# Patient Record
Sex: MALE | Race: WHITE | Employment: FULL TIME
[De-identification: names, ages, dates, MRNs, and addresses within clinical notes are randomized per-mention and may not be internally consistent; named-entity substitution may affect disease eponyms.]

---

## 2024-01-14 ENCOUNTER — HOSPITAL ENCOUNTER (EMERGENCY)
Facility: HOSPITAL | Age: 59
Discharge: HOME | End: 2024-01-14
Attending: EMERGENCY MEDICINE
Payer: COMMERCIAL

## 2024-01-14 ENCOUNTER — APPOINTMENT (OUTPATIENT)
Facility: HOSPITAL | Age: 59
End: 2024-01-14
Payer: COMMERCIAL

## 2024-01-14 VITALS
DIASTOLIC BLOOD PRESSURE: 89 MMHG | BODY MASS INDEX: 32.58 KG/M2 | RESPIRATION RATE: 16 BRPM | TEMPERATURE: 98.2 F | OXYGEN SATURATION: 100 % | HEIGHT: 69 IN | HEART RATE: 84 BPM | SYSTOLIC BLOOD PRESSURE: 158 MMHG | WEIGHT: 220 LBS

## 2024-01-14 DIAGNOSIS — I49.3 PVC'S (PREMATURE VENTRICULAR CONTRACTIONS): ICD-10-CM

## 2024-01-14 DIAGNOSIS — R42 LIGHTHEADEDNESS: ICD-10-CM

## 2024-01-14 DIAGNOSIS — R00.2 PALPITATIONS: Primary | ICD-10-CM

## 2024-01-14 LAB
ALBUMIN SERPL-MCNC: 3.6 G/DL (ref 3.5–5)
ALBUMIN/GLOB SERPL: 1 (ref 1.1–2.2)
ALP SERPL-CCNC: 68 U/L (ref 45–117)
ALT SERPL-CCNC: 42 U/L (ref 12–78)
ANION GAP SERPL CALC-SCNC: 6 MMOL/L (ref 5–15)
AST SERPL-CCNC: 27 U/L (ref 15–37)
BASOPHILS # BLD: 0 K/UL (ref 0–0.1)
BASOPHILS NFR BLD: 0 % (ref 0–1)
BILIRUB SERPL-MCNC: 0.8 MG/DL (ref 0.2–1)
BUN SERPL-MCNC: 19 MG/DL (ref 6–20)
BUN/CREAT SERPL: 15 (ref 12–20)
CALCIUM SERPL-MCNC: 8.7 MG/DL (ref 8.5–10.1)
CHLORIDE SERPL-SCNC: 105 MMOL/L (ref 97–108)
CO2 SERPL-SCNC: 24 MMOL/L (ref 21–32)
CREAT SERPL-MCNC: 1.27 MG/DL (ref 0.7–1.3)
DIFFERENTIAL METHOD BLD: ABNORMAL
EOSINOPHIL # BLD: 0.4 K/UL (ref 0–0.4)
EOSINOPHIL NFR BLD: 4 % (ref 0–7)
ERYTHROCYTE [DISTWIDTH] IN BLOOD BY AUTOMATED COUNT: 12.8 % (ref 11.5–14.5)
GLOBULIN SER CALC-MCNC: 3.7 G/DL (ref 2–4)
GLUCOSE SERPL-MCNC: 121 MG/DL (ref 65–100)
HCT VFR BLD AUTO: 49.9 % (ref 36.6–50.3)
HGB BLD-MCNC: 17.2 G/DL (ref 12.1–17)
IMM GRANULOCYTES # BLD AUTO: 0.1 K/UL (ref 0–0.04)
IMM GRANULOCYTES NFR BLD AUTO: 1 % (ref 0–0.5)
LYMPHOCYTES # BLD: 3 K/UL (ref 0.8–3.5)
LYMPHOCYTES NFR BLD: 27 % (ref 12–49)
MAGNESIUM SERPL-MCNC: 1.9 MG/DL (ref 1.6–2.4)
MCH RBC QN AUTO: 30.1 PG (ref 26–34)
MCHC RBC AUTO-ENTMCNC: 34.5 G/DL (ref 30–36.5)
MCV RBC AUTO: 87.2 FL (ref 80–99)
MONOCYTES # BLD: 0.6 K/UL (ref 0–1)
MONOCYTES NFR BLD: 6 % (ref 5–13)
NEUTS SEG # BLD: 6.9 K/UL (ref 1.8–8)
NEUTS SEG NFR BLD: 62 % (ref 32–75)
NRBC # BLD: 0 K/UL (ref 0–0.01)
NRBC BLD-RTO: 0 PER 100 WBC
NT PRO BNP: 88 PG/ML
PLATELET # BLD AUTO: 290 K/UL (ref 150–400)
PMV BLD AUTO: 8.6 FL (ref 8.9–12.9)
POTASSIUM SERPL-SCNC: 3.6 MMOL/L (ref 3.5–5.1)
PROT SERPL-MCNC: 7.3 G/DL (ref 6.4–8.2)
RBC # BLD AUTO: 5.72 M/UL (ref 4.1–5.7)
SODIUM SERPL-SCNC: 135 MMOL/L (ref 136–145)
TROPONIN I SERPL HS-MCNC: 33 NG/L (ref 0–76)
WBC # BLD AUTO: 11 K/UL (ref 4.1–11.1)

## 2024-01-14 PROCEDURE — 99285 EMERGENCY DEPT VISIT HI MDM: CPT

## 2024-01-14 PROCEDURE — 93005 ELECTROCARDIOGRAM TRACING: CPT | Performed by: EMERGENCY MEDICINE

## 2024-01-14 PROCEDURE — 84484 ASSAY OF TROPONIN QUANT: CPT

## 2024-01-14 PROCEDURE — 83735 ASSAY OF MAGNESIUM: CPT

## 2024-01-14 PROCEDURE — 71045 X-RAY EXAM CHEST 1 VIEW: CPT

## 2024-01-14 PROCEDURE — 85025 COMPLETE CBC W/AUTO DIFF WBC: CPT

## 2024-01-14 PROCEDURE — 80053 COMPREHEN METABOLIC PANEL: CPT

## 2024-01-14 PROCEDURE — 36415 COLL VENOUS BLD VENIPUNCTURE: CPT

## 2024-01-14 PROCEDURE — 83880 ASSAY OF NATRIURETIC PEPTIDE: CPT

## 2024-01-14 ASSESSMENT — PAIN - FUNCTIONAL ASSESSMENT: PAIN_FUNCTIONAL_ASSESSMENT: NONE - DENIES PAIN

## 2024-01-14 NOTE — ED TRIAGE NOTES
Pt was in  [place] at [facility] and called 911 because he felt faint and his watch told him his HR was 145. [place] dispatch called [facility] ED and 2 Rns went to assess pt and bring pt into ER for evaluation. Pt reports \"I felt like I was in Afib\". Pt denies hx of afib. Pt reports having an stress test done Monday for mitral valve issue and is waiting for results. EKG performed and given to provider

## 2024-01-14 NOTE — ED PROVIDER NOTES
moist.   Eyes:      Extraocular Movements: Extraocular movements intact.      Conjunctiva/sclera: Conjunctivae normal.      Pupils: Pupils are equal, round, and reactive to light.   Cardiovascular:      Rate and Rhythm: Normal rate and regular rhythm.      Heart sounds: Normal heart sounds.   Pulmonary:      Effort: Pulmonary effort is normal.      Breath sounds: Normal breath sounds.   Abdominal:      General: There is no distension.      Palpations: Abdomen is soft.      Tenderness: There is no abdominal tenderness.   Musculoskeletal:         General: No tenderness.      Cervical back: Neck supple.   Skin:     General: Skin is warm and dry.   Neurological:      General: No focal deficit present.      Mental Status: He is alert and oriented to person, place, and time.             EMERGENCY DEPARTMENT COURSE and DIFFERENTIAL DIAGNOSIS/MDM:   Vitals:    Vitals:    01/14/24 1323   BP: (!) 158/89   Pulse: 84   Resp: 16   Temp: 98.2 °F (36.8 °C)   TempSrc: Oral   SpO2: 100%   Weight: 99.8 kg (220 lb)   Height: 1.753 m (5' 9\")         Medical Decision Making  Amount and/or Complexity of Data Reviewed  Labs: ordered.  Radiology: ordered.  ECG/medicine tests: ordered.      58-year-old male with mitral regurgitation scheduled for cardiac surgery presents with palpitations and lightheadedness.  Noted to have PVCs on monitor.  Is afebrile with vital signs stable in no acute distress otherwise.  Labs returned very reassuringly showing no significant abnormalities.  Normal electrolytes, negative troponin.    CXR viewed by myself and read by radiology showing no acute abnormalities.  He was monitored in the ED and found to have occasional PVCs but no further runs of significant tachycardia and no further symptoms while in the ED.  He was ambulatory around the emergency department and to the bathroom without difficulty and remained well-appearing.  He was recommended alcohol use cessation and decreased caffeine intake and close

## 2024-01-14 NOTE — DISCHARGE INSTRUCTIONS
Please decrease caffeine and alcohol consumption and follow-up closely with your cardiologist and cardiac surgeon.  Please return to the emergency department if you are feeling passing out or experience chest pain.

## 2024-01-15 LAB
EKG ATRIAL RATE: 83 BPM
EKG DIAGNOSIS: NORMAL
EKG P AXIS: 80 DEGREES
EKG P-R INTERVAL: 174 MS
EKG Q-T INTERVAL: 372 MS
EKG QRS DURATION: 112 MS
EKG QTC CALCULATION (BAZETT): 437 MS
EKG R AXIS: 52 DEGREES
EKG T AXIS: -88 DEGREES
EKG VENTRICULAR RATE: 83 BPM

## 2024-01-15 PROCEDURE — 93010 ELECTROCARDIOGRAM REPORT: CPT | Performed by: SPECIALIST

## 2024-04-13 ENCOUNTER — HOSPITAL ENCOUNTER (EMERGENCY)
Facility: HOSPITAL | Age: 59
Discharge: HOME | End: 2024-04-13
Attending: STUDENT IN AN ORGANIZED HEALTH CARE EDUCATION/TRAINING PROGRAM
Payer: COMMERCIAL

## 2024-04-13 ENCOUNTER — APPOINTMENT (OUTPATIENT)
Facility: HOSPITAL | Age: 59
End: 2024-04-13
Payer: COMMERCIAL

## 2024-04-13 VITALS
HEIGHT: 69 IN | WEIGHT: 220 LBS | SYSTOLIC BLOOD PRESSURE: 123 MMHG | RESPIRATION RATE: 19 BRPM | BODY MASS INDEX: 32.58 KG/M2 | OXYGEN SATURATION: 93 % | HEART RATE: 99 BPM | DIASTOLIC BLOOD PRESSURE: 77 MMHG | TEMPERATURE: 97.7 F

## 2024-04-13 DIAGNOSIS — J18.9 PNEUMONIA DUE TO INFECTIOUS ORGANISM, UNSPECIFIED LATERALITY, UNSPECIFIED PART OF LUNG: ICD-10-CM

## 2024-04-13 DIAGNOSIS — G89.18 POST-OP PAIN: Primary | ICD-10-CM

## 2024-04-13 LAB
ALBUMIN SERPL-MCNC: 3.3 G/DL (ref 3.5–5)
ALBUMIN/GLOB SERPL: 1 (ref 1.1–2.2)
ALP SERPL-CCNC: 94 U/L (ref 45–117)
ALT SERPL-CCNC: 46 U/L (ref 12–78)
ANION GAP SERPL CALC-SCNC: 6 MMOL/L (ref 5–15)
AST SERPL-CCNC: 18 U/L (ref 15–37)
BASOPHILS # BLD: 0 K/UL (ref 0–0.1)
BASOPHILS NFR BLD: 0 % (ref 0–1)
BILIRUB SERPL-MCNC: 0.4 MG/DL (ref 0.2–1)
BUN SERPL-MCNC: 15 MG/DL (ref 6–20)
BUN/CREAT SERPL: 14 (ref 12–20)
CALCIUM SERPL-MCNC: 8.6 MG/DL (ref 8.5–10.1)
CHLORIDE SERPL-SCNC: 106 MMOL/L (ref 97–108)
CO2 SERPL-SCNC: 24 MMOL/L (ref 21–32)
CREAT SERPL-MCNC: 1.11 MG/DL (ref 0.7–1.3)
DIFFERENTIAL METHOD BLD: ABNORMAL
EOSINOPHIL # BLD: 0.6 K/UL (ref 0–0.4)
EOSINOPHIL NFR BLD: 5 % (ref 0–7)
ERYTHROCYTE [DISTWIDTH] IN BLOOD BY AUTOMATED COUNT: 13.1 % (ref 11.5–14.5)
GLOBULIN SER CALC-MCNC: 3.3 G/DL (ref 2–4)
GLUCOSE SERPL-MCNC: 165 MG/DL (ref 65–100)
HCT VFR BLD AUTO: 42.5 % (ref 36.6–50.3)
HGB BLD-MCNC: 14.6 G/DL (ref 12.1–17)
IMM GRANULOCYTES # BLD AUTO: 0 K/UL (ref 0–0.04)
IMM GRANULOCYTES NFR BLD AUTO: 0 % (ref 0–0.5)
LYMPHOCYTES # BLD: 1.6 K/UL (ref 0.8–3.5)
LYMPHOCYTES NFR BLD: 15 % (ref 12–49)
MCH RBC QN AUTO: 29.9 PG (ref 26–34)
MCHC RBC AUTO-ENTMCNC: 34.4 G/DL (ref 30–36.5)
MCV RBC AUTO: 86.9 FL (ref 80–99)
MONOCYTES # BLD: 0.6 K/UL (ref 0–1)
MONOCYTES NFR BLD: 5 % (ref 5–13)
NEUTS SEG # BLD: 8.3 K/UL (ref 1.8–8)
NEUTS SEG NFR BLD: 75 % (ref 32–75)
NRBC # BLD: 0 K/UL (ref 0–0.01)
NRBC BLD-RTO: 0 PER 100 WBC
PLATELET # BLD AUTO: 393 K/UL (ref 150–400)
PMV BLD AUTO: 7.8 FL (ref 8.9–12.9)
POTASSIUM SERPL-SCNC: 3.7 MMOL/L (ref 3.5–5.1)
PROT SERPL-MCNC: 6.6 G/DL (ref 6.4–8.2)
RBC # BLD AUTO: 4.89 M/UL (ref 4.1–5.7)
SODIUM SERPL-SCNC: 136 MMOL/L (ref 136–145)
TROPONIN I SERPL HS-MCNC: 40 NG/L (ref 0–76)
TROPONIN I SERPL HS-MCNC: 41 NG/L (ref 0–76)
WBC # BLD AUTO: 11.1 K/UL (ref 4.1–11.1)

## 2024-04-13 PROCEDURE — 99285 EMERGENCY DEPT VISIT HI MDM: CPT

## 2024-04-13 PROCEDURE — 84484 ASSAY OF TROPONIN QUANT: CPT

## 2024-04-13 PROCEDURE — 96376 TX/PRO/DX INJ SAME DRUG ADON: CPT

## 2024-04-13 PROCEDURE — 80053 COMPREHEN METABOLIC PANEL: CPT

## 2024-04-13 PROCEDURE — 85025 COMPLETE CBC W/AUTO DIFF WBC: CPT

## 2024-04-13 PROCEDURE — 6370000000 HC RX 637 (ALT 250 FOR IP): Performed by: STUDENT IN AN ORGANIZED HEALTH CARE EDUCATION/TRAINING PROGRAM

## 2024-04-13 PROCEDURE — 71275 CT ANGIOGRAPHY CHEST: CPT

## 2024-04-13 PROCEDURE — 36415 COLL VENOUS BLD VENIPUNCTURE: CPT

## 2024-04-13 PROCEDURE — 6360000002 HC RX W HCPCS: Performed by: STUDENT IN AN ORGANIZED HEALTH CARE EDUCATION/TRAINING PROGRAM

## 2024-04-13 PROCEDURE — 6360000004 HC RX CONTRAST MEDICATION: Performed by: STUDENT IN AN ORGANIZED HEALTH CARE EDUCATION/TRAINING PROGRAM

## 2024-04-13 PROCEDURE — 93005 ELECTROCARDIOGRAM TRACING: CPT | Performed by: STUDENT IN AN ORGANIZED HEALTH CARE EDUCATION/TRAINING PROGRAM

## 2024-04-13 PROCEDURE — 96374 THER/PROPH/DIAG INJ IV PUSH: CPT

## 2024-04-13 RX ORDER — DOXYCYCLINE HYCLATE 100 MG
100 TABLET ORAL ONCE
Status: COMPLETED | OUTPATIENT
Start: 2024-04-13 | End: 2024-04-13

## 2024-04-13 RX ORDER — LIDOCAINE 4 G/G
1 PATCH TOPICAL DAILY
Status: DISCONTINUED | OUTPATIENT
Start: 2024-04-13 | End: 2024-04-13 | Stop reason: HOSPADM

## 2024-04-13 RX ORDER — LIDOCAINE 4 G/G
1 PATCH TOPICAL DAILY
Qty: 30 PATCH | Refills: 0 | Status: SHIPPED | OUTPATIENT
Start: 2024-04-13 | End: 2024-05-13

## 2024-04-13 RX ORDER — DOXYCYCLINE HYCLATE 100 MG
100 TABLET ORAL 2 TIMES DAILY
Qty: 20 TABLET | Refills: 0 | Status: SHIPPED | OUTPATIENT
Start: 2024-04-13 | End: 2024-04-23

## 2024-04-13 RX ORDER — MORPHINE SULFATE 2 MG/ML
2 INJECTION, SOLUTION INTRAMUSCULAR; INTRAVENOUS
Status: COMPLETED | OUTPATIENT
Start: 2024-04-13 | End: 2024-04-13

## 2024-04-13 RX ORDER — ACETAMINOPHEN 500 MG
500 TABLET ORAL 4 TIMES DAILY PRN
Qty: 360 TABLET | Refills: 1 | Status: SHIPPED | OUTPATIENT
Start: 2024-04-13

## 2024-04-13 RX ADMIN — MORPHINE SULFATE 2 MG: 2 INJECTION, SOLUTION INTRAMUSCULAR; INTRAVENOUS at 19:34

## 2024-04-13 RX ADMIN — DOXYCYCLINE HYCLATE 100 MG: 100 TABLET, COATED ORAL at 19:34

## 2024-04-13 RX ADMIN — MORPHINE SULFATE 2 MG: 2 INJECTION, SOLUTION INTRAMUSCULAR; INTRAVENOUS at 17:50

## 2024-04-13 RX ADMIN — IOPAMIDOL 100 ML: 755 INJECTION, SOLUTION INTRAVENOUS at 16:59

## 2024-04-13 ASSESSMENT — PAIN SCALES - GENERAL
PAINLEVEL_OUTOF10: 7
PAINLEVEL_OUTOF10: 6
PAINLEVEL_OUTOF10: 5
PAINLEVEL_OUTOF10: 7
PAINLEVEL_OUTOF10: 7

## 2024-04-13 ASSESSMENT — PAIN DESCRIPTION - LOCATION
LOCATION: BACK;SHOULDER
LOCATION: CHEST
LOCATION: SHOULDER
LOCATION: SHOULDER

## 2024-04-13 ASSESSMENT — LIFESTYLE VARIABLES
HOW OFTEN DO YOU HAVE A DRINK CONTAINING ALCOHOL: NEVER
HOW MANY STANDARD DRINKS CONTAINING ALCOHOL DO YOU HAVE ON A TYPICAL DAY: PATIENT DOES NOT DRINK

## 2024-04-13 ASSESSMENT — PAIN - FUNCTIONAL ASSESSMENT: PAIN_FUNCTIONAL_ASSESSMENT: 0-10

## 2024-04-13 ASSESSMENT — PAIN DESCRIPTION - ORIENTATION: ORIENTATION: LEFT;RIGHT

## 2024-04-13 ASSESSMENT — PAIN DESCRIPTION - DESCRIPTORS: DESCRIPTORS: ACHING;SHARP

## 2024-04-13 NOTE — ED TRIAGE NOTES
Patient is a 58 year old male complaining of chest pain that started this morning. Patient recently had a mitral valve replacement on the 4/4/2024. Patient reports the pain changes with breathing. Patient reports the pain came on while sitting at home during his taxes. Patient alert and oriented x4, in NAD. Denies shortness of breath.

## 2024-04-13 NOTE — ED NOTES
Pt discharged to home in [place], states understanding of dc instructions and followup, rest and hydrate, pain control, rx x 3 given pt ambulatory with spouse tolerating well

## 2024-04-13 NOTE — ED NOTES
Verbal shift change report given to ROXY Bledsoe (oncoming nurse) by ROXY Fontaine (offgoing nurse). Report included the following information Nurse Handoff Report, Index, ED SBAR, MAR, and Recent Results.

## 2024-04-14 LAB
EKG ATRIAL RATE: 88 BPM
EKG DIAGNOSIS: NORMAL
EKG P AXIS: 53 DEGREES
EKG P-R INTERVAL: 208 MS
EKG Q-T INTERVAL: 420 MS
EKG QRS DURATION: 140 MS
EKG QTC CALCULATION (BAZETT): 508 MS
EKG R AXIS: 30 DEGREES
EKG T AXIS: 106 DEGREES
EKG VENTRICULAR RATE: 88 BPM

## 2024-04-14 PROCEDURE — 93010 ELECTROCARDIOGRAM REPORT: CPT | Performed by: SPECIALIST

## 2024-04-14 NOTE — ED PROVIDER NOTES
Course User Index  [AS] Scott Guerra MD       CONSULTS:  None    PROCEDURES:  Procedures    FINAL IMPRESSION      1. Post-op pain    2. Pneumonia due to infectious organism, unspecified laterality, unspecified part of lung          DISPOSITION/PLAN   DISPOSITION Decision To Discharge 04/13/2024 07:51:38 PM    (Please note that portions of this note were completed with a voice recognition program.  Efforts were made to edit the dictations but occasionally words are mis-transcribed.)    Scott Guerra MD (electronically signed)  Emergency Attending Physician        Scott Guerra MD  04/14/24 1483

## 2025-01-02 ENCOUNTER — OFFICE VISIT (OUTPATIENT)
Age: 60
End: 2025-01-02

## 2025-01-02 VITALS
RESPIRATION RATE: 18 BRPM | TEMPERATURE: 98.9 F | WEIGHT: 225 LBS | SYSTOLIC BLOOD PRESSURE: 120 MMHG | HEIGHT: 70 IN | HEART RATE: 60 BPM | BODY MASS INDEX: 32.21 KG/M2 | DIASTOLIC BLOOD PRESSURE: 77 MMHG | OXYGEN SATURATION: 97 %

## 2025-01-02 DIAGNOSIS — R05.1 ACUTE COUGH: ICD-10-CM

## 2025-01-02 DIAGNOSIS — J06.9 VIRAL UPPER RESPIRATORY TRACT INFECTION: Primary | ICD-10-CM

## 2025-01-02 LAB
INFLUENZA A ANTIGEN, POC: NEGATIVE
INFLUENZA B ANTIGEN, POC: NEGATIVE
Lab: NORMAL
PERFORMING INSTRUMENT: NORMAL
QC PASS/FAIL: NORMAL
SARS-COV-2, POC: NORMAL

## 2025-01-02 RX ORDER — CARVEDILOL 6.25 MG/1
6.25 TABLET ORAL 2 TIMES DAILY WITH MEALS
COMMUNITY

## 2025-01-02 RX ORDER — SACUBITRIL AND VALSARTAN 24; 26 MG/1; MG/1
1 TABLET, FILM COATED ORAL 2 TIMES DAILY
COMMUNITY

## 2025-01-02 ASSESSMENT — ENCOUNTER SYMPTOMS
SINUS PRESSURE: 1
SORE THROAT: 1
SHORTNESS OF BREATH: 0
WHEEZING: 0
COUGH: 1
CHEST TIGHTNESS: 0

## 2025-01-02 NOTE — PROGRESS NOTES
2025   Deonte Evans (: 1965) is a 59 y.o. male, New patient, here for evaluation of the following chief complaint(s):  Cough, Congestion (Cough and congestion - several days - lots of post nasal drip), and Chest Congestion     ASSESSMENT/PLAN:  Below is the assessment and plan developed based on review of pertinent history, physical exam, labs, studies, and medications.  1. Viral upper respiratory tract infection  2. Acute cough  -     POCT COVID-19, Antigen  -     POCT Influenza A/B Antigen         Handout given with care instructions  2. OTC for symptom management. Increase fluid intake, ensure adequate nutritional intake.  3. Follow up with PCP as needed.  4. Go to ED with development of any acute symptoms.     Follow up:  Return if symptoms worsen or fail to improve.  Follow up immediately for any new, worsening or changes or if symptoms are not improving over the next 5-7 days.     SUBJECTIVE/OBJECTIVE:    Cough  Associated symptoms include a sore throat. Pertinent negatives include no chills, fever, shortness of breath or wheezing.   Chest Congestion  Associated symptoms: congestion, cough, fatigue and sore throat    Associated symptoms: no fever, no shortness of breath and no wheezing             Cough, Congestion (Cough and congestion - several days - lots of post nasal drip), and Chest Congestion      Results for orders placed or performed in visit on 25   POCT COVID-19, Antigen   Result Value Ref Range    SARS-COV-2, POC Not-Detected Not Detected    Lot Number [id number]     QC Pass/Fail pass     Performing Instrument BinaxNOW    POCT Influenza A/B Antigen   Result Value Ref Range    Inflenza A Ag negative     Influenza B Ag negative            Review of Systems   Constitutional:  Positive for fatigue. Negative for chills and fever.   HENT:  Positive for congestion, sinus pressure and sore throat.    Respiratory:  Positive for cough. Negative for chest tightness, shortness of breath and

## 2025-01-02 NOTE — PATIENT INSTRUCTIONS
If you have high blood pressure, take Coricidin HBP (or the generic form) instead.  Follow instructions on the box.    Please follow up with your primary care provider within 2-5 days if your signs and symptoms have not resolved or worsened.     Please go immediately to the Emergency Department if you develop shortness of breath, chest pain and uncontrollable fever above 100.4F.     Nasal Congestion:  Flonase (over the counter) nasal spray, once a day  Saline irrigation kits help wash out sinuses 1-2 times a day  Normal saline nasal spray     Cough:  Throat lozenges, hot tea, and honey may help  Vicks VapoRub at night to help with cough and relieve muscles aches and pain  If not prescribed a cough medication, Delsym is an option.  It is an over the counter cough medication containing dextromethorphan to help suppress cough at night              If you have high blood pressure, take Coricidin HBP (or the generic form) instead.  Follow instructions on the box.     Congestion:  For thick mucus, take Mucinex (with Guafenesin only) to help thin the mucus.  Follow instructions on the box.  You will need to drink plenty of water with this medication.     Sore Throat:  Lozenges, as needed. Cepacol lozenges will help numb the throat  Chloraseptic spray also helps to numb throat pain  Salt water gargles to soothe throat pain     Sinus pain/pressure:  Warm, wet towel on face to help with facial sinus pain/pressure  Neti Pot or Saline Rinse can be helpful      Headache/Pain Fever/Body Aches:  If you can take NSAIDs, take Ibuprofen 400-800mg every 8 hours as needed  If you cannot take NSAIDs, take Tylenol 325-500mg every 6 hours as needed     Miscellanous:  Zyrtec/Xyzal/Allegra/Claritin during the day or Benadryl at night may help with allergies  Simple foods like chicken noodle soup, smoothies, hot tea with lemon and honey may also help

## 2025-01-27 ENCOUNTER — OFFICE VISIT (OUTPATIENT)
Age: 60
End: 2025-01-27

## 2025-01-27 VITALS
DIASTOLIC BLOOD PRESSURE: 71 MMHG | SYSTOLIC BLOOD PRESSURE: 130 MMHG | WEIGHT: 236 LBS | HEART RATE: 65 BPM | HEIGHT: 70 IN | BODY MASS INDEX: 33.79 KG/M2 | RESPIRATION RATE: 16 BRPM | OXYGEN SATURATION: 95 % | TEMPERATURE: 97.6 F

## 2025-01-27 DIAGNOSIS — J06.9 VIRAL URI: Primary | ICD-10-CM

## 2025-01-27 NOTE — PATIENT INSTRUCTIONS
Rosa Mr. Evans,    I recommend a very aggressive regimen of vitamins and other natural remedies.  Start taking the following, and probably best to take it with a small meal or with food in your stomach.    Vitamin C oral tablets 500 mg, taking 1 tablet twice daily.    Zinc at 50 mg tablets, taking 1 tablet every morning.  Vitamin D3 at 1000 IU tablet, taking 1 tablet every evening.    Mucinex (guaifenesin) extra strength tablets, taking 1 tablet twice daily.  (Note, take plain old Mucinex and make sure there is nothing else on the ingredient list other than guaifenesin.  The extra-strength 12-hour version is the best).  This will help the most with head and chest congestion.    Use honey as a primary cough syrup.  You can certainly buy some of the natural honey based over-the-counter remedies such as Zarbee's, however, I personally take a jar of honey and eat a tablespoon or 2 as often as I can to help with my cough.  You could do 2 tablespoons every hour all day long if you wanted to.  There have been randomized controlled double blinded placebo based studies which have put honey against every other version of prescription cough medicine, and honey has always been proven to be more effective.    For body aches and fevers, I prefer Tylenol.  Take the 500 mg extra strength tablets, and take 2 tablets (1000 mg) every 6 hours.  This is the maximum dose, you can never have more than 4000 mg in a 24-hour period.  If this does not suffice, you can \"bridge the gap\" with ibuprofen 200 mg tablets by taking 3 of them (600 mg) around the 3-hour anai in between your Tylenol doses.  It is important to take ibuprofen with food, however.    Please let me know if you have any issues and I will be happy to discuss further.  I would do the above for at least 1 week, but really as long as you are continuing to feel ill.    Sincerely,   Dr. CHAN

## 2025-01-27 NOTE — PROGRESS NOTES
Deonte Evans (:  1965) is a 59 y.o. male,Established patient, here for evaluation of the following chief complaint(s):  Cough (Pt c/o cough, sx onset Saturday. ), Generalized Body Aches (Pt c/o body aches, sx onset Saturday. ), Fever (Pt c/o fever of a 100.4, sx onset Saturday. Pt states he was taking tylenol and his allergy medications and sx had subsided. ), and Diarrhea (Pt c/o diarrhea, sx onset today. )         Assessment & Plan  Viral URI    Exam today is reassuring, I recommend continued conservative measures. At this point I see no indication for steroids or abx. Helpful instruction printed in the AVS.  Red flag signs and strict return / ER precautions were discussed, and the patient expressed understanding and agreement to the plan. All questions were answered.            Return in about 5 days (around 2025), or if symptoms worsen or fail to improve.       Subjective   Patient is complaining of the below listed symptoms which began Saturday (2-3 days ago).     Cough, Generalized Body Aches, Fever (100.4). Pt states he was taking tylenol and his allergy medications and fever had subsided. Pt also c/o diarrhea, sx onset today.    He denies any N/V. He is able to eat and drink fine and stay well hydrated. No chest pain or dyspnea.     His most cumbersome symptom is the body aches and chills - he had to leave work early due to that.       History provided by:  Patient   used: No        Review of Systems   All other systems reviewed and are negative.         Objective   Physical Exam  Constitutional:       General: He is not in acute distress.     Appearance: Normal appearance. He is not ill-appearing.   HENT:      Head: Normocephalic and atraumatic.      Right Ear: External ear normal.      Left Ear: External ear normal.      Nose: Nose normal.   Eyes:      General: No scleral icterus.        Right eye: No discharge.         Left eye: No discharge.      Extraocular Movements:

## 2025-02-04 NOTE — DISCHARGE INSTRUCTIONS
Continue all your home medications as previously described.  Take the prescribed antibiotic doxycycline for possible lung infection.  You can continue your home oxycodone as well as add Tylenol as well as lidocaine patch for further pain control.  Return to the emergency department if you have any changing or worsening symptoms or other concerns.  Otherwise follow-up with your cardiologist and cardiac surgeon as soon as possible within 24 hours for reevaluation and further care.   Pt's spouse, fay. Fay asked if may appointment could be follow up visit.